# Patient Record
Sex: MALE | Race: WHITE | Employment: FULL TIME | URBAN - METROPOLITAN AREA
[De-identification: names, ages, dates, MRNs, and addresses within clinical notes are randomized per-mention and may not be internally consistent; named-entity substitution may affect disease eponyms.]

---

## 2018-03-27 ENCOUNTER — HOSPITAL ENCOUNTER (OUTPATIENT)
Dept: LAB | Age: 33
Discharge: HOME | End: 2018-03-27
Payer: COMMERCIAL

## 2018-03-27 ENCOUNTER — OFFICE VISIT (OUTPATIENT)
Dept: UROLOGY | Age: 33
End: 2018-03-27

## 2018-03-27 VITALS
DIASTOLIC BLOOD PRESSURE: 84 MMHG | OXYGEN SATURATION: 98 % | BODY MASS INDEX: 34.65 KG/M2 | HEART RATE: 74 BPM | WEIGHT: 270 LBS | HEIGHT: 74 IN | SYSTOLIC BLOOD PRESSURE: 120 MMHG | TEMPERATURE: 98.6 F

## 2018-03-27 DIAGNOSIS — Z51.81 ENCOUNTER FOR MONITORING TESTOSTERONE REPLACEMENT THERAPY: ICD-10-CM

## 2018-03-27 DIAGNOSIS — E29.1 HYPOGONADISM MALE: Primary | ICD-10-CM

## 2018-03-27 DIAGNOSIS — Z79.890 ENCOUNTER FOR MONITORING TESTOSTERONE REPLACEMENT THERAPY: ICD-10-CM

## 2018-03-27 DIAGNOSIS — E29.1 HYPOGONADISM MALE: ICD-10-CM

## 2018-03-27 LAB
BILIRUB UR QL STRIP: NEGATIVE
ERYTHROCYTE [DISTWIDTH] IN BLOOD BY AUTOMATED COUNT: 12.1 % (ref 11.6–14.5)
GLUCOSE UR-MCNC: NEGATIVE MG/DL
HCT VFR BLD AUTO: 53.1 % (ref 36–48)
HGB BLD-MCNC: 18.5 G/DL (ref 13–16)
KETONES P FAST UR STRIP-MCNC: NEGATIVE MG/DL
MCH RBC QN AUTO: 31.4 PG (ref 24–34)
MCHC RBC AUTO-ENTMCNC: 34.8 G/DL (ref 31–37)
MCV RBC AUTO: 90 FL (ref 74–97)
PH UR STRIP: 6 [PH] (ref 4.6–8)
PLATELET # BLD AUTO: 201 K/UL (ref 135–420)
PMV BLD AUTO: 11 FL (ref 9.2–11.8)
PROT UR QL STRIP: NEGATIVE
PSA SERPL-MCNC: 0.3 NG/ML (ref 0–4)
RBC # BLD AUTO: 5.9 M/UL (ref 4.7–5.5)
SP GR UR STRIP: 1 (ref 1–1.03)
UA UROBILINOGEN AMB POC: NORMAL (ref 0.2–1)
URINALYSIS CLARITY POC: CLEAR
URINALYSIS COLOR POC: YELLOW
URINE BLOOD POC: NEGATIVE
URINE LEUKOCYTES POC: NEGATIVE
URINE NITRITES POC: NEGATIVE
WBC # BLD AUTO: 6 K/UL (ref 4.6–13.2)

## 2018-03-27 PROCEDURE — 84153 ASSAY OF PSA TOTAL: CPT | Performed by: UROLOGY

## 2018-03-27 PROCEDURE — 85027 COMPLETE CBC AUTOMATED: CPT | Performed by: UROLOGY

## 2018-03-27 PROCEDURE — 84403 ASSAY OF TOTAL TESTOSTERONE: CPT | Performed by: UROLOGY

## 2018-03-27 NOTE — PROGRESS NOTES
Shani Valentin 26 y.o. male     Mr. Carbajal Has seen today for hypogonadism follow-up seen in this office for the past 2-1/2 years-previously on testosterone replacement therapy by intramuscular injection of testosterone reporting favorable response with increased strength, stamina, libido, and erectile function    [place] [facility] [place]  Patient reports favorable response to TRT with increased stamina, strength, and libido     PSA 0.4 in January 2014   Testosterone level 767 ng/dl on 21 May 2014 (7 days after intramuscular injection of 100 mg)   Hematocrit 47.8     PSA 0.4 in January 2014  Hematocrit 47.9 in May 24     PSA 0.4 on 22 May 2015  Hematocrit 51.6  \"    Testosterone 281 in December 2015  Testosterone 767 in May 2014  PSA 0.4 in 2013  Testosterone 353 2013     Review of Systems:   CNS: No seizures syncope or headaches no visual changes  Respiratory: No wheezing no shortness of breath  Cardiovascular:No palpitations or chest pain  Intestinal:No dyspepsia or change in bowel habits  Urinary: No irritative voiding symptoms  Skeletal: No bone or joint pain  Endocrine:Hypogonadism on TRT by intramuscular injections  Other:       Allergies: No Known Allergies   Medications:    Current Outpatient Prescriptions   Medication Sig Dispense Refill    cephALEXin (KEFLEX) 500 mg capsule Take 1 Cap by mouth three (3) times daily. 9 Cap 0    oxyCODONE-acetaminophen (PERCOCET) 5-325 mg per tablet Take 1 Tab by mouth every four (4) hours as needed for Pain. Max Daily Amount: 6 Tabs. 6 Tab 0    DEPO-TESTOSTERONE 200 mg/mL injection INJECT 1 ML EVERY 2 WEEKS. 10 mL 0    aspirin delayed-release 81 mg tablet Take  by mouth daily.  modafinil (PROVIGIL) 200 mg tablet Take 200 mg by mouth daily.  TESTOSTERONE  mg by IntraMUSCular route every fourteen (14) days. History reviewed. No pertinent past medical history. History reviewed. No pertinent surgical history. History reviewed. No pertinent family history. Physical Examination: Well-nourished mature male in no apparent distress    Urinalysis: Negative dipstick/nitrite negative/heme-negative        Impression: Hypogonadism        Plan: PSA, CBC, testosterone panel today    Rx testosterone in oil 200 mg/cc dispensed 10 cc signify 1 cc IM every 3 weeks 3 cc syringes #1221-gauge needles #12 refill ×2 prescription faxed to compounding pharmacy in [facility] today    RTC 3 weeks      More than 1/2 of this 25 minute visit was spent in counselling and coordination of care, as described above. Tonja Bashir MD  -electronically signed-    PLEASE NOTE:  This document has been produced using voice recognition software. Unrecognized errors in transcription may be present.

## 2018-03-27 NOTE — PATIENT INSTRUCTIONS
Hypogonadism: Care Instructions  Your Care Instructions    Men who have hypogonadism do not make enough testosterone. This hormone allows men to make sperm and to have normal physical male traits. The condition also is known as testosterone deficiency. It can lead to loss of sex drive, weakness, impotence, infertility, and weakened bones. Many things can cause this condition. Causes include injured testicles, certain medicines, an infection, and aging. Having a long-term health problem such as kidney or liver disease or being obese can cause it. So can surgery or radiation treatment for another health problem. It also can be present at birth. It is most often treated with testosterone hormone. You can get the hormone as a shot or through a patch or gel on the skin. Follow-up care is a key part of your treatment and safety. Be sure to make and go to all appointments, and call your doctor if you are having problems. It's also a good idea to know your test results and keep a list of the medicines you take. How can you care for yourself at home? · Take your medicines exactly as prescribed. Call your doctor if you think you are having a problem with your medicine. You will get more details on the specific medicines your doctor prescribes. · Follow your treatment plan. If you use testosterone hormones, follow your doctor's instructions. Hormones can help relieve many of the effects of this condition, such as impotence. But it may take weeks or months for your symptoms to improve. · Get plenty of exercise. And make sure to get plenty of calcium and vitamin D in your diet. Eat more dairy foods and green vegetables. They can help keep your bones from getting weak. · If you have a hard time dealing with this condition, talk to your doctor about joining a support group. Talking with others who have the same problems can help you cope. When should you call for help?   Call your doctor now or seek immediate medical care if:  ? · You have headaches. ? · You have problems with your vision. ? Watch closely for changes in your health, and be sure to contact your doctor if:  ? · You have trouble getting or keeping an erection. ? · You have a loss of body hair. ? · You feel weak or tired a lot of the time. ? · Your breasts are getting larger. ? · You do not get better as expected. Where can you learn more? Go to http://wes-aleshia.info/. Enter 99 [id number] in the search box to learn more about \"Hypogonadism: Care Instructions. \"  Current as of: May 12, 2017  Content Version: 11.4  © 4436-4772 Healthwise, Dedicated Devices. Care instructions adapted under license by [employer] (which disclaims liability or warranty for this information). If you have questions about a medical condition or this instruction, always ask your healthcare professional. [address] any warranty or liability for your use of this information.

## 2018-03-27 NOTE — PROGRESS NOTES
RBV. Per Dr. Regina Bang lab drawn in office today for PSA, CBC and testosterone for monitoring testosterone use and hypogonadism.

## 2018-03-27 NOTE — MR AVS SNAPSHOT
[facility] [place] [address] [address] 
[phone] Patient: Abdifatah Lovelace MRN: [id number] TTS:7/7[birth year] Visit Information Date & Time Provider Department Dept. Phone Encounter #  
 3/27/2018  3:00 PM Nydia Castro [facility] [phone] Your Appointments 4/10/2018  3:15 PM  
Office Visit with César Ha MD  
[facility]) Appt Note: lab results/start TRT  
 [address] [place] [address] [address]  
[phone] [address] [place] [address] Upcoming Health Maintenance Date Due DTaP/Tdap/Td series (1 - Tdap) 3/5/2004 Influenza Age 5 to Adult 8/1/2017 Allergies as of 3/27/2018  Review Complete On: 3/27/2018 By: Keiry Cook No Known Allergies Current Immunizations  Never Reviewed No immunizations on file. Not reviewed this visit You Were Diagnosed With   
  
 Codes Comments Hypogonadism male    -  Primary ICD-10-CM: E29.1 ICD-9-CM: 257.2 Encounter for monitoring testosterone replacement therapy     ICD-10-CM: Z51.81, [id number] ICD-9-CM: V58.83, V58.69 Vitals BP Pulse Temp Height(growth percentile) Weight(growth percentile) SpO2  
 120/84 (BP 1 Location: Left arm, BP Patient Position: Sitting) 74 98.6 °F (37 °C) (Oral) 6' 2\" (1.88 m) 270 lb (122.5 kg) 98% BMI Smoking Status 34.67 kg/m2 Never Smoker Vitals History BMI and BSA Data Body Mass Index Body Surface Area  
 34.67 kg/m 2 2.53 m 2 Preferred Pharmacy Pharmacy Name Phone [id number] [address] [facility] [phone] Your Updated Medication List  
  
   
This list is accurate as of 3/27/18  4:04 PM.  Always use your most recent med list.  
  
  
  
  
 aspirin delayed-release 81 mg tablet Take  by mouth daily. cephALEXin 500 mg capsule Commonly known as:  Sahra Escoto Take 1 Cap by mouth three (3) times daily. DEPO-TESTOSTERONE 200 mg/mL injection Generic drug:  testosterone cypionate INJECT 1 ML EVERY 2 WEEKS. oxyCODONE-acetaminophen 5-325 mg per tablet Commonly known as:  PERCOCET Take 1 Tab by mouth every four (4) hours as needed for Pain. Max Daily Amount: 6 Tabs. PROVIGIL 200 mg tablet Generic drug:  modafinil Take 200 mg by mouth daily. TESTOSTERONE IM  
100 mg by IntraMUSCular route every fourteen (14) days. We Performed the Following AMB POC URINALYSIS DIP STICK AUTO W/O MICRO [40141 CPT(R)] COLLECTION VENOUS BLOOD,VENIPUNCTURE [id number] CPT(R)] Patient Instructions Hypogonadism: Care Instructions Your Care Instructions Men who have hypogonadism do not make enough testosterone. This hormone allows men to make sperm and to have normal physical male traits. The condition also is known as testosterone deficiency. It can lead to loss of sex drive, weakness, impotence, infertility, and weakened bones. Many things can cause this condition. Causes include injured testicles, certain medicines, an infection, and aging. Having a long-term health problem such as kidney or liver disease or being obese can cause it. So can surgery or radiation treatment for another health problem. It also can be present at birth. It is most often treated with testosterone hormone. You can get the hormone as a shot or through a patch or gel on the skin. Follow-up care is a key part of your treatment and safety. Be sure to make and go to all appointments, and call your doctor if you are having problems. It's also a good idea to know your test results and keep a list of the medicines you take. How can you care for yourself at home? · Take your medicines exactly as prescribed.  Call your doctor if you think you are having a problem with your medicine. You will get more details on the specific medicines your doctor prescribes. · Follow your treatment plan. If you use testosterone hormones, follow your doctor's instructions. Hormones can help relieve many of the effects of this condition, such as impotence. But it may take weeks or months for your symptoms to improve. · Get plenty of exercise. And make sure to get plenty of calcium and vitamin D in your diet. Eat more dairy foods and green vegetables. They can help keep your bones from getting weak. · If you have a hard time dealing with this condition, talk to your doctor about joining a support group. Talking with others who have the same problems can help you cope. When should you call for help? Call your doctor now or seek immediate medical care if: 
? · You have headaches. ? · You have problems with your vision. ? Watch closely for changes in your health, and be sure to contact your doctor if: 
? · You have trouble getting or keeping an erection. ? · You have a loss of body hair. ? · You feel weak or tired a lot of the time. ? · Your breasts are getting larger. ? · You do not get better as expected. Where can you learn more? Go to http://wes-aleshia.info/. Enter 99 [id number] in the search box to learn more about \"Hypogonadism: Care Instructions. \" Current as of: May 12, 2017 Content Version: 11.4 © 2481-5336 Kibaran Resources. Care instructions adapted under license by Where's Up (which disclaims liability or warranty for this information). If you have questions about a medical condition or this instruction, always ask your healthcare professional. [address] any warranty or liability for your use of this information. Introducing [facility]!    
 [facility] [employer] introduces [email or web address] patient portal. Now you can access parts of your medical record, email your doctor's office, and request medication refills online. 1. In your internet browser, go to [email or web address] 2. Click on the First Time User? Click Here link in the Sign In box. You will see the New Member Sign Up page. 3. Enter your VIOSO Access Code exactly as it appears below. You will not need to use this code after youve completed the sign-up process. If you do not sign up before the expiration date, you must request a new code. · VIOSO Access Code: [email or web address] Expires: 6/25/2018  2:54 PM 
 
4. Enter the last four digits of your Social Security Number (xxxx) and Date of Birth (mm/dd/yyyy) as indicated and click Submit. You will be taken to the next sign-up page. 5. Create a VIOSO ID. This will be your VIOSO login ID and cannot be changed, so think of one that is secure and easy to remember. 6. Create a VIOSO password. You can change your password at any time. 7. Enter your Password Reset Question and Answer. This can be used at a later time if you forget your password. 8. Enter your e-mail address. You will receive e-mail notification when new information is available in [address] 9. Click Sign Up. You can now view and download portions of your medical record. 10. Click the Download Summary menu link to download a portable copy of your medical information. If you have questions, please visit the Frequently Asked Questions section of the VIOSO website. Remember, VIOSO is NOT to be used for urgent needs. For medical emergencies, dial 911. Now available from your iPhone and Android! Please provide this summary of care documentation to your next provider. If you have any questions after today's visit, please call [phone].

## 2018-03-27 NOTE — PROGRESS NOTES
Mr. Effie Rodriguez has a reminder for a \"due or due soon\" health maintenance. I have asked that he contact his primary care provider for follow-up on this health maintenance.

## 2018-03-28 LAB — TESTOST SERPL-MCNC: 140 NG/DL (ref 264–916)

## 2018-04-04 ENCOUNTER — DOCUMENTATION ONLY (OUTPATIENT)
Dept: UROLOGY | Age: 33
End: 2018-04-04

## 2018-04-10 ENCOUNTER — OFFICE VISIT (OUTPATIENT)
Dept: UROLOGY | Age: 33
End: 2018-04-10

## 2018-04-10 VITALS
BODY MASS INDEX: 35.16 KG/M2 | HEIGHT: 74 IN | WEIGHT: 274 LBS | DIASTOLIC BLOOD PRESSURE: 68 MMHG | HEART RATE: 97 BPM | TEMPERATURE: 98.4 F | SYSTOLIC BLOOD PRESSURE: 100 MMHG | OXYGEN SATURATION: 98 %

## 2018-04-10 DIAGNOSIS — E29.1 HYPOGONADISM IN MALE: Primary | ICD-10-CM

## 2018-04-10 RX ORDER — TESTOSTERONE CYPIONATE 200 MG/ML
200 INJECTION INTRAMUSCULAR ONCE
Qty: 1 ML | Refills: 0
Start: 2018-04-10 | End: 2018-04-10

## 2018-04-10 RX ORDER — TESTOSTERONE CYPIONATE 200 MG/ML
INJECTION INTRAMUSCULAR ONCE
COMMUNITY

## 2018-04-10 NOTE — MR AVS SNAPSHOT
[address] [place] [address] 
[phone] Patient: Inez Colón MRN: [id number] QUINTEN:[birth year] Visit Information Date & Time Provider Department Dept. Phone Encounter #  
 4/10/2018  3:15 PM Shelbi Rivera, N[facility] [phone] [id number] Upcoming Health Maintenance Date Due DTaP/Tdap/Td series (1 - Tdap) 3/5/2004 Influenza Age 5 to Adult 8/1/2017 Allergies as of 4/10/2018  Review Complete On: 3/27/2018 By: Shelbi Rivera MD  
 No Known Allergies Current Immunizations  Never Reviewed No immunizations on file. Not reviewed this visit You Were Diagnosed With   
  
 Codes Comments Hypogonadism in male    -  Primary ICD-10-CM: E29.1 ICD-9-CM: 257.2 Vitals BP Pulse Temp Height(growth percentile) Weight(growth percentile) SpO2  
 100/68 (BP 1 Location: Left arm, BP Patient Position: Sitting) 97 98.4 °F (36.9 °C) (Oral) 6' 2\" (1.88 m) 274 lb (124.3 kg) 98% BMI Smoking Status 35.18 kg/m2 Never Smoker BMI and BSA Data Body Mass Index Body Surface Area  
 35.18 kg/m 2 2.55 m 2 Preferred Pharmacy Pharmacy Name Phone [id number] [address] [facility] [phone] Your Updated Medication List  
  
   
This list is accurate as of 4/10/18  3:34 PM.  Always use your most recent med list.  
  
  
  
  
 aspirin delayed-release 81 mg tablet Take  by mouth daily. cephALEXin 500 mg capsule Commonly known as:  Thorne Awkward Take 1 Cap by mouth three (3) times daily. * testosterone cypionate 200 mg/mL injection Commonly known as:  DEPOTESTOTERONE CYPIONATE  
by IntraMUSCular route once. * DEPO-TESTOSTERONE 200 mg/mL injection Generic drug:  testosterone cypionate INJECT 1 ML EVERY 2 WEEKS. oxyCODONE-acetaminophen 5-325 mg per tablet Commonly known as:  PERCOCET  
 Take 1 Tab by mouth every four (4) hours as needed for Pain. Max Daily Amount: 6 Tabs. PROVIGIL 200 mg tablet Generic drug:  modafinil Take 200 mg by mouth daily. TESTOSTERONE IM  
100 mg by IntraMUSCular route every fourteen (14) days. * Notice: This list has 2 medication(s) that are the same as other medications prescribed for you. Read the directions carefully, and ask your doctor or other care provider to review them with you. Patient Instructions Hypogonadism: Care Instructions Your Care Instructions Men who have hypogonadism do not make enough testosterone. This hormone allows men to make sperm and to have normal physical male traits. The condition also is known as testosterone deficiency. It can lead to loss of sex drive, weakness, impotence, infertility, and weakened bones. Many things can cause this condition. Causes include injured testicles, certain medicines, an infection, and aging. Having a long-term health problem such as kidney or liver disease or being obese can cause it. So can surgery or radiation treatment for another health problem. It also can be present at birth. It is most often treated with testosterone hormone. You can get the hormone as a shot or through a patch or gel on the skin. Follow-up care is a key part of your treatment and safety. Be sure to make and go to all appointments, and call your doctor if you are having problems. It's also a good idea to know your test results and keep a list of the medicines you take. How can you care for yourself at home? · Take your medicines exactly as prescribed. Call your doctor if you think you are having a problem with your medicine. You will get more details on the specific medicines your doctor prescribes. · Follow your treatment plan. If you use testosterone hormones, follow your doctor's instructions.  Hormones can help relieve many of the effects of this condition, such as impotence. But it may take weeks or months for your symptoms to improve. · Get plenty of exercise. And make sure to get plenty of calcium and vitamin D in your diet. Eat more dairy foods and green vegetables. They can help keep your bones from getting weak. · If you have a hard time dealing with this condition, talk to your doctor about joining a support group. Talking with others who have the same problems can help you cope. When should you call for help? Call your doctor now or seek immediate medical care if: 
? · You have headaches. ? · You have problems with your vision. ? Watch closely for changes in your health, and be sure to contact your doctor if: 
? · You have trouble getting or keeping an erection. ? · You have a loss of body hair. ? · You feel weak or tired a lot of the time. ? · Your breasts are getting larger. ? · You do not get better as expected. Where can you learn more? Go to http://wes-aleshia.info/. Enter 99 [id number] in the search box to learn more about \"Hypogonadism: Care Instructions. \" Current as of: May 12, 2017 Content Version: 11.4 © 7479-1964 ResponseTap (formerly AdInsight). Care instructions adapted under license by Exposed Vocals (which disclaims liability or warranty for this information). If you have questions about a medical condition or this instruction, always ask your healthcare professional. [place] [address] any warranty or liability for your use of this information. Introducing [address]! [facility] introduces [employer] patient portal. Now you can access parts of your medical record, email your doctor's office, and request medication refills online. 1. In your internet browser, go to [email or web address] 2. Click on the First Time User? Click Here link in the Sign In box. You will see the New Member Sign Up page. 3. Enter your Health Innovation Technologies Access Code exactly as it appears below. You will not need to use this code after youve completed the sign-up process. If you do not sign up before the expiration date, you must request a new code. · [employer] Innovation Technologies Access Code: [email or web address] Expires: 6/25/2018  2:54 PM 
 
4. Enter the last four digits of your Social Security Number (xxxx) and Date of Birth (mm/dd/yyyy) as indicated and click Submit. You will be taken to the next sign-up page. 5. Create a Loudcastert ID. This will be your Health Innovation Technologies login ID and cannot be changed, so think of one that is secure and easy to remember. 6. Create a [employer] Innovation Technologies password. You can change your password at any time. 7. Enter your Password Reset Question and Answer. This can be used at a later time if you forget your password. 8. Enter your e-mail address. You will receive e-mail notification when new information is available in [address]. 9. Click Sign Up. You can now view and download portions of your medical record. 10. Click the Download Summary menu link to download a portable copy of your medical information. If you have questions, please visit the Frequently Asked Questions section of the Health Innovation Technologies website. Remember, [employer] is NOT to be used for urgent needs. For medical emergencies, dial 911. Now available from your iPhone and Android! Please provide this summary of care documentation to your next provider. If you have any questions after today's visit, please call [phone].

## 2018-04-10 NOTE — PATIENT INSTRUCTIONS
Hypogonadism: Care Instructions  Your Care Instructions    Men who have hypogonadism do not make enough testosterone. This hormone allows men to make sperm and to have normal physical male traits. The condition also is known as testosterone deficiency. It can lead to loss of sex drive, weakness, impotence, infertility, and weakened bones. Many things can cause this condition. Causes include injured testicles, certain medicines, an infection, and aging. Having a long-term health problem such as kidney or liver disease or being obese can cause it. So can surgery or radiation treatment for another health problem. It also can be present at birth. It is most often treated with testosterone hormone. You can get the hormone as a shot or through a patch or gel on the skin. Follow-up care is a key part of your treatment and safety. Be sure to make and go to all appointments, and call your doctor if you are having problems. It's also a good idea to know your test results and keep a list of the medicines you take. How can you care for yourself at home? · Take your medicines exactly as prescribed. Call your doctor if you think you are having a problem with your medicine. You will get more details on the specific medicines your doctor prescribes. · Follow your treatment plan. If you use testosterone hormones, follow your doctor's instructions. Hormones can help relieve many of the effects of this condition, such as impotence. But it may take weeks or months for your symptoms to improve. · Get plenty of exercise. And make sure to get plenty of calcium and vitamin D in your diet. Eat more dairy foods and green vegetables. They can help keep your bones from getting weak. · If you have a hard time dealing with this condition, talk to your doctor about joining a support group. Talking with others who have the same problems can help you cope. When should you call for help?   Call your doctor now or seek immediate medical care if:  ? · You have headaches. ? · You have problems with your vision. ? Watch closely for changes in your health, and be sure to contact your doctor if:  ? · You have trouble getting or keeping an erection. ? · You have a loss of body hair. ? · You feel weak or tired a lot of the time. ? · Your breasts are getting larger. ? · You do not get better as expected. Where can you learn more? Go to http://wes-aleshia.info/. Enter 99 [id number] in the search box to learn more about \"Hypogonadism: Care Instructions. \"  Current as of: May 12, 2017  Content Version: 11.4  © 4255-1003 Healthwise, GoInformatics. Care instructions adapted under license by Logicbroker (which disclaims liability or warranty for this information). If you have questions about a medical condition or this instruction, always ask your healthcare professional. [address] any warranty or liability for your use of this information.

## 2018-04-10 NOTE — PROGRESS NOTES
MrAnya Grover has a reminder for a \"due or due soon\" health maintenance. I have asked that he contact his primary care provider for follow-up on this health maintenance.

## 2018-04-11 NOTE — PROGRESS NOTES
Jennifer Lee 26 y.o. male     Mr. Salena Perez seen today for hypogonadism follow-up  previously on testosterone replacement therapy by intramuscular injection of testosterone reporting favorable response with increased strength, stamina, libido, and erectile function     Vasectomy at [facility] [place] November 2017  Patient reports favorable response to TRT with increased stamina, strength, and libido      PSA 0.4 in January 2014   Testosterone level 767 ng/dl on 21 May 2014 (7 days after intramuscular injection of 100 mg)   Hematocrit 47.8      PSA 0.4 in January 2014  Hematocrit 47.9 in May 24      PSA 0.4 on 22 May 2015  Hematocrit 51.6  \"     Testosterone 281 in December 2015  Testosterone 767 in May 2014  PSA 0.4 in 2013  Testosterone 353 2013    Testosterone 143 on 28 March 2018    PSA 0.3 on 27 March 2018  HCT 53.1      Review of Systems:   CNS: No seizures syncope or headaches no visual changes  Respiratory: No wheezing no shortness of breath  Cardiovascular:No palpitations or chest pain  Intestinal:No dyspepsia or change in bowel habits  Urinary: No irritative voiding symptoms  Skeletal: No bone or joint pain  Endocrine:Hypogonadism on TRT by intramuscular injections  Other:      Allergies: No Known Allergies   Medications:    Current Outpatient Prescriptions   Medication Sig Dispense Refill    testosterone cypionate (DEPOTESTOTERONE CYPIONATE) 200 mg/mL injection by IntraMUSCular route once.  cephALEXin (KEFLEX) 500 mg capsule Take 1 Cap by mouth three (3) times daily. 9 Cap 0    oxyCODONE-acetaminophen (PERCOCET) 5-325 mg per tablet Take 1 Tab by mouth every four (4) hours as needed for Pain. Max Daily Amount: 6 Tabs. 6 Tab 0    DEPO-TESTOSTERONE 200 mg/mL injection INJECT 1 ML EVERY 2 WEEKS. 10 mL 0    aspirin delayed-release 81 mg tablet Take  by mouth daily.  modafinil (PROVIGIL) 200 mg tablet Take 200 mg by mouth daily.       TESTOSTERONE  mg by IntraMUSCular route every fourteen (14) days. History reviewed. No pertinent past medical history. History reviewed. No pertinent surgical history. History reviewed. No pertinent family history. Physical Examination: Well-nourished mature male in no apparent distress      Impression: Hypogonadism        Plan: Testosterone replacement therapy with intramuscular injection of testosterone                         Testosterone 200 mg IM right buttock today    Rx testosterone in oil 200 mg/cc dispensed 10 cc signify 1 cc IM every 3 weeks 3 cc syringes #1021-gauge needles #10 refill ×2 prescription faxed to compounding pharmacy in [facility] today    Described discussed prospect of phlebotomy to maintain hematocrit within normal range while on TRT    RTC 3 months for CBC      More than 1/2 of this 15 minute visit was spent in counselling and coordination of care, as described above. Elinor Renee MD  -electronically signed-    PLEASE NOTE:  This document has been produced using voice recognition software. Unrecognized errors in transcription may be present.

## 2018-07-10 ENCOUNTER — DOCUMENTATION ONLY (OUTPATIENT)
Dept: UROLOGY | Age: 33
End: 2018-07-10

## 2018-07-10 NOTE — PROGRESS NOTES
Called Mr. Shayy Ortega to see if he could come in the office to repeat his CBC. He has a elevated hematocrit and is on Testosterone. ,he has not returned my call. If he does not come in and repeat his blood work he can not have any refills on his medication.

## 2018-07-16 ENCOUNTER — DOCUMENTATION ONLY (OUTPATIENT)
Dept: UROLOGY | Age: 33
End: 2018-07-16

## 2018-07-16 NOTE — PROGRESS NOTES
Anya Hayley Iraheta returned my call I let him know he needed to repeat a CBC . He said ok but has not returned to office yet. No refills on Testosterone until CBC is done.

## 2018-07-18 ENCOUNTER — HOSPITAL ENCOUNTER (OUTPATIENT)
Dept: LAB | Age: 33
Discharge: HOME | End: 2018-07-18
Payer: COMMERCIAL

## 2018-07-18 ENCOUNTER — LAB ONLY (OUTPATIENT)
Dept: UROLOGY | Age: 33
End: 2018-07-18

## 2018-07-18 DIAGNOSIS — R71.8 ELEVATED HEMATOCRIT: ICD-10-CM

## 2018-07-18 DIAGNOSIS — Z51.81 ENCOUNTER FOR MONITORING TESTOSTERONE REPLACEMENT THERAPY: ICD-10-CM

## 2018-07-18 DIAGNOSIS — Z51.81 ENCOUNTER FOR MONITORING TESTOSTERONE REPLACEMENT THERAPY: Primary | ICD-10-CM

## 2018-07-18 DIAGNOSIS — Z79.890 ENCOUNTER FOR MONITORING TESTOSTERONE REPLACEMENT THERAPY: ICD-10-CM

## 2018-07-18 DIAGNOSIS — Z79.890 ENCOUNTER FOR MONITORING TESTOSTERONE REPLACEMENT THERAPY: Primary | ICD-10-CM

## 2018-07-18 LAB
ERYTHROCYTE [DISTWIDTH] IN BLOOD BY AUTOMATED COUNT: 12.9 % (ref 11.6–14.5)
HCT VFR BLD AUTO: 50.8 % (ref 36–48)
HGB BLD-MCNC: 17.7 G/DL (ref 13–16)
MCH RBC QN AUTO: 32.4 PG (ref 24–34)
MCHC RBC AUTO-ENTMCNC: 34.8 G/DL (ref 31–37)
MCV RBC AUTO: 93 FL (ref 74–97)
PLATELET # BLD AUTO: 220 K/UL (ref 135–420)
PMV BLD AUTO: 10.9 FL (ref 9.2–11.8)
RBC # BLD AUTO: 5.46 M/UL (ref 4.7–5.5)
WBC # BLD AUTO: 6.1 K/UL (ref 4.6–13.2)

## 2018-07-18 PROCEDURE — 85027 COMPLETE CBC AUTOMATED: CPT | Performed by: UROLOGY

## 2019-01-17 ENCOUNTER — OFFICE VISIT (OUTPATIENT)
Dept: UROLOGY | Age: 34
End: 2019-01-17

## 2019-01-17 ENCOUNTER — HOSPITAL ENCOUNTER (OUTPATIENT)
Dept: LAB | Age: 34
Discharge: HOME | End: 2019-01-17
Payer: COMMERCIAL

## 2019-01-17 VITALS
BODY MASS INDEX: 34.91 KG/M2 | SYSTOLIC BLOOD PRESSURE: 120 MMHG | DIASTOLIC BLOOD PRESSURE: 80 MMHG | HEART RATE: 96 BPM | WEIGHT: 272 LBS | OXYGEN SATURATION: 77 % | HEIGHT: 74 IN

## 2019-01-17 DIAGNOSIS — Z51.81 ENCOUNTER FOR MONITORING TESTOSTERONE REPLACEMENT THERAPY: ICD-10-CM

## 2019-01-17 DIAGNOSIS — E29.1 HYPOGONADISM IN MALE: Primary | ICD-10-CM

## 2019-01-17 DIAGNOSIS — Z79.890 ENCOUNTER FOR MONITORING TESTOSTERONE REPLACEMENT THERAPY: ICD-10-CM

## 2019-01-17 DIAGNOSIS — E29.1 HYPOGONADISM IN MALE: ICD-10-CM

## 2019-01-17 LAB
BILIRUB UR QL STRIP: NEGATIVE
ERYTHROCYTE [DISTWIDTH] IN BLOOD BY AUTOMATED COUNT: 12.2 % (ref 11.6–14.5)
GLUCOSE UR-MCNC: NEGATIVE MG/DL
HCT VFR BLD AUTO: 48.8 % (ref 36–48)
HGB BLD-MCNC: 16.8 G/DL (ref 13–16)
KETONES P FAST UR STRIP-MCNC: NEGATIVE MG/DL
MCH RBC QN AUTO: 30.8 PG (ref 24–34)
MCHC RBC AUTO-ENTMCNC: 34.4 G/DL (ref 31–37)
MCV RBC AUTO: 89.5 FL (ref 74–97)
PH UR STRIP: 6.5 [PH] (ref 4.6–8)
PLATELET # BLD AUTO: 207 K/UL (ref 135–420)
PMV BLD AUTO: 10.3 FL (ref 9.2–11.8)
PROT UR QL STRIP: NEGATIVE
PSA SERPL-MCNC: 0.4 NG/ML (ref 0–4)
RBC # BLD AUTO: 5.45 M/UL (ref 4.7–5.5)
SP GR UR STRIP: 1.02 (ref 1–1.03)
UA UROBILINOGEN AMB POC: NORMAL (ref 0.2–1)
URINALYSIS CLARITY POC: CLEAR
URINALYSIS COLOR POC: YELLOW
URINE BLOOD POC: NEGATIVE
URINE LEUKOCYTES POC: NEGATIVE
URINE NITRITES POC: NEGATIVE
WBC # BLD AUTO: 6.8 K/UL (ref 4.6–13.2)

## 2019-01-17 PROCEDURE — 84153 ASSAY OF PSA TOTAL: CPT

## 2019-01-17 PROCEDURE — 85027 COMPLETE CBC AUTOMATED: CPT

## 2019-01-17 NOTE — PATIENT INSTRUCTIONS
Hypogonadism: Care Instructions  Your Care Instructions    Men who have hypogonadism do not make enough testosterone. This hormone allows men to make sperm and to have normal physical male traits. The condition also is known as testosterone deficiency. It can lead to loss of sex drive, weakness, impotence, infertility, and weakened bones. Many things can cause this condition. Causes include injured testicles, certain medicines, an infection, and aging. Having a long-term health problem such as kidney or liver disease or being obese can cause it. So can surgery or radiation treatment for another health problem. It also can be present at birth. It is most often treated with testosterone hormone. You can get the hormone as a shot or through a patch or gel on the skin. Follow-up care is a key part of your treatment and safety. Be sure to make and go to all appointments, and call your doctor if you are having problems. It's also a good idea to know your test results and keep a list of the medicines you take. How can you care for yourself at home? · Take your medicines exactly as prescribed. Call your doctor if you think you are having a problem with your medicine. You will get more details on the specific medicines your doctor prescribes. · Follow your treatment plan. If you use testosterone hormones, follow your doctor's instructions. Hormones can help relieve many of the effects of this condition, such as impotence. But it may take weeks or months for your symptoms to improve. · Get plenty of exercise. And make sure to get plenty of calcium and vitamin D in your diet. Eat more dairy foods and green vegetables. They can help keep your bones from getting weak. · If you have a hard time dealing with this condition, talk to your doctor about joining a support group. Talking with others who have the same problems can help you cope. When should you call for help?   Call your doctor now or seek immediate medical care if:    · You have headaches.     · You have problems with your vision.    Watch closely for changes in your health, and be sure to contact your doctor if:    · You have trouble getting or keeping an erection.     · You have a loss of body hair.     · You feel weak or tired a lot of the time.     · Your breasts are getting larger.     · You do not get better as expected. Where can you learn more? Go to http://wes-aleshia.info/. Enter 99 [id number] in the search box to learn more about \"Hypogonadism: Care Instructions. \"  Current as of: March 15, 2018  Content Version: 11.8  © 7346-4595 [employer]. Care instructions adapted under license by [employer] (which disclaims liability or warranty for this information). If you have questions about a medical condition or this instruction, always ask your healthcare professional. [address] any warranty or liability for your use of this information.

## 2019-01-17 NOTE — PROGRESS NOTES
MrAnya Stillsper has a reminder for a \"due or due soon\" health maintenance. I have asked that he contact his primary care provider for follow-up on this health maintenance.

## 2019-01-18 NOTE — PROGRESS NOTES
Jaden Ramirez 26 y.o. male     Mr. Emil Grover seen today for hypogonadism follow-up testosterone replacement/therapy held in [facility] because of rising hematocrit in April 2018  Patient has had one phlebotomy session-wishes to proceed with TRT    testosterone replacement therapy by intramuscular injection of testosterone- reporting favorable response with increased strength, stamina, libido, and erectile function     Vasectomy at urology [facility] November 2017  Patient reports favorable response to TRT with increased stamina, strength, and libido      PSA 0.4 in January 2014   Testosterone level 767 ng/dl on 21 May 2014 (7 days after intramuscular injection of 100 mg)   Hematocrit 47.8      PSA 0.4 in January 2014  Hematocrit 47.9 in May 24      PSA 0.4 on 22 May 2015  Hematocrit 51.6  \"     Testosterone 281 in December 2015  Testosterone 767 in May 2014  PSA 0.4 in 2013  Testosterone 353 2013     Testosterone 143 on 28 March 2018     PSA 0.3 on 27 March 2018  HCT 53.1                                                 TRT suspended April 2018                                 to me x1 in May 2018    PSA 0.41 17 January 2019  Hematocrit 48. 8      Review of Systems:   CNS: No seizures syncope or headaches no visual changes  Respiratory: No wheezing no shortness of breath  Cardiovascular:No palpitations or chest pain  Intestinal:No dyspepsia or change in bowel habits  Urinary: No irritative voiding symptoms  Skeletal: No bone or joint pain  Endocrine:Hypogonadism on TRT by intramuscular injections  Other:              Allergies: No Known Allergies   Medications:    Current Outpatient Medications   Medication Sig Dispense Refill    TESTOSTERONE  mg by IntraMUSCular route every fourteen (14) days.  testosterone cypionate (DEPOTESTOTERONE CYPIONATE) 200 mg/mL injection by IntraMUSCular route once.  cephALEXin (KEFLEX) 500 mg capsule Take 1 Cap by mouth three (3) times daily.  9 Cap 0    oxyCODONE-acetaminophen (PERCOCET) 5-325 mg per tablet Take 1 Tab by mouth every four (4) hours as needed for Pain. Max Daily Amount: 6 Tabs. 6 Tab 0    DEPO-TESTOSTERONE 200 mg/mL injection INJECT 1 ML EVERY 2 WEEKS. 10 mL 0    aspirin delayed-release 81 mg tablet Take  by mouth daily.  modafinil (PROVIGIL) 200 mg tablet Take 200 mg by mouth daily. History reviewed. No pertinent past medical history. History reviewed. No pertinent surgical history. Social History     Socioeconomic History    Marital status:      Spouse name: Not on file    Number of children: Not on file    Years of education: Not on file    Highest education level: Not on file   Social Needs    Financial resource strain: Not on file    Food insecurity - worry: Not on file    Food insecurity - inability: Not on file    Transportation needs - medical: Not on file   VIPAAR needs - non-medical: Not on file   Occupational History    Not on file   Tobacco Use    Smoking status: Never Smoker    Smokeless tobacco: Never Used   Substance and Sexual Activity    Alcohol use: Yes    Drug use: No    Sexual activity: Not on file   Other Topics Concern    Not on file   Social History Narrative    Not on file      History reviewed. No pertinent family history. Physical Examination: Well-nourished mature male in no apparent distress      Urinalysis: Negative dipstick/nitrite negative/heme-negative    Impression: Hypogonadism responding favorably to TRT                        Erythrocytosis responding favorably to phlebotomy and TRT hiatus        Plan: Okay to resume TRT-    testosterone 200 mg IM every 3 weeks    RTC 6 months CBC PSA ALO      More than 1/2 of this 15 minute visit was spent in counselling and coordination of care, as described above. Armando Sherwood MD  -electronically signed-    PLEASE NOTE:  This document has been produced using voice recognition software.  Unrecognized errors in transcription may be present.

## 2019-03-12 ENCOUNTER — DOCUMENTATION ONLY (OUTPATIENT)
Dept: UROLOGY | Age: 34
End: 2019-03-12

## 2019-07-18 ENCOUNTER — OFFICE VISIT (OUTPATIENT)
Dept: UROLOGY | Age: 34
End: 2019-07-18

## 2019-07-18 VITALS
SYSTOLIC BLOOD PRESSURE: 144 MMHG | OXYGEN SATURATION: 98 % | HEART RATE: 77 BPM | WEIGHT: 274 LBS | BODY MASS INDEX: 35.16 KG/M2 | DIASTOLIC BLOOD PRESSURE: 92 MMHG | HEIGHT: 74 IN

## 2019-07-18 DIAGNOSIS — Z51.81 ENCOUNTER FOR MONITORING TESTOSTERONE REPLACEMENT THERAPY: ICD-10-CM

## 2019-07-18 DIAGNOSIS — Z79.890 ENCOUNTER FOR MONITORING TESTOSTERONE REPLACEMENT THERAPY: ICD-10-CM

## 2019-07-18 DIAGNOSIS — E29.1 HYPOGONADISM IN MALE: Primary | ICD-10-CM

## 2019-07-18 DIAGNOSIS — E66.01 SEVERE OBESITY (HCC): ICD-10-CM

## 2019-07-18 LAB
BILIRUB UR QL STRIP: NEGATIVE
GLUCOSE UR-MCNC: NEGATIVE MG/DL
KETONES P FAST UR STRIP-MCNC: NEGATIVE MG/DL
PH UR STRIP: 7 [PH] (ref 4.6–8)
PROT UR QL STRIP: NEGATIVE
SP GR UR STRIP: 1.01 (ref 1–1.03)
UA UROBILINOGEN AMB POC: NORMAL (ref 0.2–1)
URINALYSIS CLARITY POC: CLEAR
URINALYSIS COLOR POC: YELLOW
URINE BLOOD POC: NEGATIVE
URINE LEUKOCYTES POC: NEGATIVE
URINE NITRITES POC: NEGATIVE

## 2019-07-18 RX ORDER — SYRINGE W-NEEDLE,DISPOSAB,3 ML 22GX1"
SYRINGE, EMPTY DISPOSABLE MISCELLANEOUS
Refills: 2 | COMMUNITY
Start: 2019-06-05

## 2019-07-18 NOTE — PATIENT INSTRUCTIONS
Hypogonadism: Care Instructions  Your Care Instructions    Men who have hypogonadism do not make enough testosterone. This hormone allows men to make sperm and to have normal physical male traits. The condition also is known as testosterone deficiency. It can lead to loss of sex drive, weakness, impotence, infertility, and weakened bones. Many things can cause this condition. Causes include injured testicles, certain medicines, an infection, and aging. Having a long-term health problem such as kidney or liver disease or being obese can cause it. So can surgery or radiation treatment for another health problem. It also can be present at birth. It is most often treated with testosterone hormone. You can get the hormone as a shot or through a patch or gel on the skin. Follow-up care is a key part of your treatment and safety. Be sure to make and go to all appointments, and call your doctor if you are having problems. It's also a good idea to know your test results and keep a list of the medicines you take. How can you care for yourself at home? · Take your medicines exactly as prescribed. Call your doctor if you think you are having a problem with your medicine. You will get more details on the specific medicines your doctor prescribes. · Follow your treatment plan. If you use testosterone hormones, follow your doctor's instructions. Hormones can help relieve many of the effects of this condition, such as impotence. But it may take weeks or months for your symptoms to improve. · Get plenty of exercise. And make sure to get plenty of calcium and vitamin D in your diet. Eat more dairy foods and green vegetables. They can help keep your bones from getting weak. · If you have a hard time dealing with this condition, talk to your doctor about joining a support group. Talking with others who have the same problems can help you cope. When should you call for help?   Call your doctor now or seek immediate medical care if:    · You have headaches.     · You have problems with your vision.    Watch closely for changes in your health, and be sure to contact your doctor if:    · You have trouble getting or keeping an erection.     · You have a loss of body hair.     · You feel weak or tired a lot of the time.     · Your breasts are getting larger.     · You do not get better as expected. Where can you learn more? Go to http://wes-aleshia.info/. Enter 99 091782 in the search box to learn more about \"Hypogonadism: Care Instructions. \"  Current as of: March 14, 2018  Content Version: 11.9  © 5446-9717 [employer]. Care instructions adapted under license by [employer] (which disclaims liability or warranty for this information). If you have questions about a medical condition or this instruction, always ask your healthcare professional. [address] any warranty or liability for your use of this information.

## 2019-07-18 NOTE — PROGRESS NOTES
Mr. Bryce Ramos has a reminder for a \"due or due soon\" health maintenance. I have asked that he contact his primary care provider for follow-up on this health maintenance.

## 2019-07-19 ENCOUNTER — HOSPITAL ENCOUNTER (OUTPATIENT)
Dept: LAB | Age: 34
Discharge: HOME | End: 2019-07-19
Payer: COMMERCIAL

## 2019-07-19 DIAGNOSIS — E29.1 HYPOGONADISM IN MALE: ICD-10-CM

## 2019-07-19 DIAGNOSIS — Z51.81 ENCOUNTER FOR MONITORING TESTOSTERONE REPLACEMENT THERAPY: ICD-10-CM

## 2019-07-19 DIAGNOSIS — Z79.890 ENCOUNTER FOR MONITORING TESTOSTERONE REPLACEMENT THERAPY: ICD-10-CM

## 2019-07-19 LAB
ERYTHROCYTE [DISTWIDTH] IN BLOOD BY AUTOMATED COUNT: 12.5 % (ref 11.6–14.5)
HCT VFR BLD AUTO: 50 % (ref 36–48)
HGB BLD-MCNC: 17.6 G/DL (ref 13–16)
MCH RBC QN AUTO: 31.8 PG (ref 24–34)
MCHC RBC AUTO-ENTMCNC: 35.2 G/DL (ref 31–37)
MCV RBC AUTO: 90.3 FL (ref 74–97)
PLATELET # BLD AUTO: 180 K/UL (ref 135–420)
PMV BLD AUTO: 10 FL (ref 9.2–11.8)
PSA SERPL-MCNC: 0.4 NG/ML (ref 0–4)
RBC # BLD AUTO: 5.54 M/UL (ref 4.7–5.5)
WBC # BLD AUTO: 7.5 K/UL (ref 4.6–13.2)

## 2019-07-19 PROCEDURE — 85027 COMPLETE CBC AUTOMATED: CPT

## 2019-07-19 PROCEDURE — 36415 COLL VENOUS BLD VENIPUNCTURE: CPT

## 2019-07-19 PROCEDURE — 84153 ASSAY OF PSA TOTAL: CPT

## 2019-07-19 NOTE — PROGRESS NOTES
Geovani Saltness 37 y.o. male     Mr. William Umaña seen today for hypogonadism follow-up currently on testosterone replacement therapy by IM injection of testosterone-self injection at home reporting favorable response with improved strength stamina erectile function with ADL and overall sense of well-being  testosterone replacement/therapy held in abeyance because of rising hematocrit in April 2018  Patient has had one phlebotomy session-wishes to proceed with TRT     testosterone replacement therapy by intramuscular injection of testosterone- reporting favorable response with increased strength, stamina, libido, and erectile function     Vasectomy at urology [facility] November 2017  Patient reports favorable response to TRT with increased stamina, strength, and libido      PSA 0.4 in January 2014   Testosterone level 767 ng/dl on 21 May 2014 (7 days after intramuscular injection of 100 mg)   Hematocrit 47.8      PSA 0.4 in January 2014  Hematocrit 47.9 in May 24      PSA 0.4 on 22 May 2015  Hematocrit 51.6  \"     Testosterone 281 in December 2015  Testosterone 767 in May 2014  PSA 0.4 in 2013  Testosterone 353 2013     Testosterone 143 on 28 March 2018     PSA 0.3 on 27 March 2018  HCT 53.1                                                 TRT suspended April 2018                                         PSA 0.4 in January 2019:                         TRT resumed in January 2019  Hematocrit 48. 8      Review of Systems:   CNS: No seizures syncope or headaches no visual changes  Respiratory: No wheezing no shortness of breath  Cardiovascular:No palpitations or chest pain  Intestinal:No dyspepsia or change in bowel habits  Urinary: No irritative voiding symptoms  Skeletal: No bone or joint pain  Endocrine:Hypogonadism on TRT by intramuscular injections  Other:        Allergies: No Known Allergies   Medications:    Current Outpatient Medications   Medication Sig Dispense Refill    DEPO-TESTOSTERONE 200 mg/mL injection INJECT 1 ML EVERY 2 WEEKS. 10 mL 0    MONOJECT SYRINGE 3 mL 22 gauge x 1\" syrg INJECT 1 ML EVERY 3 WEEKS  2    testosterone cypionate (DEPOTESTOTERONE CYPIONATE) 200 mg/mL injection by IntraMUSCular route once.  cephALEXin (KEFLEX) 500 mg capsule Take 1 Cap by mouth three (3) times daily. 9 Cap 0    oxyCODONE-acetaminophen (PERCOCET) 5-325 mg per tablet Take 1 Tab by mouth every four (4) hours as needed for Pain. Max Daily Amount: 6 Tabs. 6 Tab 0    aspirin delayed-release 81 mg tablet Take  by mouth daily.  modafinil (PROVIGIL) 200 mg tablet Take 200 mg by mouth daily.  TESTOSTERONE  mg by IntraMUSCular route every fourteen (14) days. History reviewed. No pertinent past medical history. History reviewed. No pertinent surgical history. Social History     Socioeconomic History    Marital status:      Spouse name: Not on file    Number of children: Not on file    Years of education: Not on file    Highest education level: Not on file   Occupational History    Not on file   Social Needs    Financial resource strain: Not on file    Food insecurity:     Worry: Not on file     Inability: Not on file    Transportation needs:     Medical: Not on file     Non-medical: Not on file   Tobacco Use    Smoking status: Never Smoker    Smokeless tobacco: Never Used   Substance and Sexual Activity    Alcohol use:  Yes    Drug use: No    Sexual activity: Not on file   Lifestyle    Physical activity:     Days per week: Not on file     Minutes per session: Not on file    Stress: Not on file   Relationships    Social connections:     Talks on phone: Not on file     Gets together: Not on file     Attends [employer] service: Not on file     Active member of club or organization: Not on file     Attends meetings of clubs or organizations: Not on file     Relationship status: Not on file    Intimate partner violence:     Fear of current or ex partner: Not on file     Emotionally abused: Not on file     Physically abused: Not on file     Forced sexual activity: Not on file   Other Topics Concern    Not on file   Social History Narrative    Not on file      History reviewed. No pertinent family history. Physical Examination: Well-nourished mature male in no apparent distress    Prostate by LAO is large rounded smooth benign consistency and nontender-no induration no nodularity   No rectal masses induration or tenderness     urinalysis: Negative dipstick/nitrite negative/heme-negative    Impression: Hypogonadism responding favorably to testosterone replacement therapy by IM injection of testosterone        Plan: Testosterone 20 mg IM every 3 weeks    Rx testosterone in oil 200 mg/cc dispense 10 cc signify 1 cc IM every 3 weeks 3 cc syringes #10 21-gauge needles #10 refill x3 prescription faxed to compounding pharmacy in [facility]    [phone]    RTC 6 months CBC PSA      More than 1/2 of this 15 minute visit was spent in counselling and coordination of care, as described above. Mele Claudio MD  -electronically signed-    PLEASE NOTE:  This document has been produced using voice recognition software. Unrecognized errors in transcription may be present.

## 2019-10-09 ENCOUNTER — TELEPHONE (OUTPATIENT)
Dept: UROLOGY | Age: 34
End: 2019-10-09

## 2019-10-09 NOTE — TELEPHONE ENCOUNTER
Patient requesting new prescription because [facility] closed. Patient requesting new rx so he can take it to his choice of pharmacy. Requesting the rx to be mailed to him.

## 2019-10-18 DIAGNOSIS — E29.1 HYPOGONADISM IN MALE: Primary | ICD-10-CM

## 2019-10-18 RX ORDER — TESTOSTERONE CYPIONATE 200 MG/ML
INJECTION INTRAMUSCULAR
Qty: 10 ML | Refills: 1 | Status: SHIPPED | OUTPATIENT
Start: 2019-10-18

## 2019-10-18 RX ORDER — SYRINGE W-NEEDLE,DISPOSAB,3 ML 25GX5/8"
1 SYRINGE, EMPTY DISPOSABLE MISCELLANEOUS
Qty: 10 PEN NEEDLE | Refills: 1 | Status: SHIPPED | OUTPATIENT
Start: 2019-10-18

## 2022-03-20 PROBLEM — E66.01 SEVERE OBESITY (HCC): Status: ACTIVE | Noted: 2019-07-18

## 2022-11-22 ENCOUNTER — OFFICE VISIT (OUTPATIENT)
Dept: ORTHOPEDIC SURGERY | Age: 37
End: 2022-11-22
Payer: COMMERCIAL

## 2022-11-22 VITALS — WEIGHT: 251 LBS | BODY MASS INDEX: 32.21 KG/M2 | TEMPERATURE: 97.1 F | HEIGHT: 74 IN

## 2022-11-22 DIAGNOSIS — M23.006 CYST OF MENISCUS OF RIGHT KNEE: Primary | ICD-10-CM

## 2022-11-22 DIAGNOSIS — M25.561 RIGHT KNEE PAIN, UNSPECIFIED CHRONICITY: ICD-10-CM

## 2022-11-22 PROCEDURE — 99203 OFFICE O/P NEW LOW 30 MIN: CPT | Performed by: ORTHOPAEDIC SURGERY

## 2022-11-22 PROCEDURE — 73560 X-RAY EXAM OF KNEE 1 OR 2: CPT | Performed by: ORTHOPAEDIC SURGERY

## 2022-11-22 NOTE — PROGRESS NOTES
Sam Bucio  1985   Chief Complaint   Patient presents with    Knee Pain     Rt         HISTORY OF PRESENT ILLNESS  Sam Bucio is a 42 y.o. male who presents today for evaluation of right knee. Pt referred by Dr. Umair Willis. He rates his pain 0/10 today. He notes a knot/raised area on the medial aspect of his right knee since August. He first noticed this the day after doing some weighted squats. No other specific injury or trauma. Is having minimal associated pain. Has tried following treatments: Injections:NO; Brace:NO; Therapy:NO; Cane/Crutch:NO       No Known Allergies     History reviewed. No pertinent past medical history. Social History     Socioeconomic History    Marital status:      Spouse name: Not on file    Number of children: Not on file    Years of education: Not on file    Highest education level: Not on file   Occupational History    Not on file   Tobacco Use    Smoking status: Never    Smokeless tobacco: Never   Substance and Sexual Activity    Alcohol use: Yes    Drug use: No    Sexual activity: Not on file   Other Topics Concern    Not on file   Social History Narrative    Not on file     Social Determinants of Health     Financial Resource Strain: Not on file   Food Insecurity: Not on file   Transportation Needs: Not on file   Physical Activity: Not on file   Stress: Not on file   Social Connections: Not on file   Intimate Partner Violence: Not on file   Housing Stability: Not on file      History reviewed. No pertinent surgical history. History reviewed. No pertinent family history. Current Outpatient Medications   Medication Sig    testosterone cypionate (DEPOTESTOTERONE CYPIONATE) 200 mg/mL injection 200 mg/ml IM injection every 3 weeks. syringe with needle (SYRINGE 3CC/28GL2-2/2\") 3 mL 21 gauge x 1 1/2\" syrg 1 mL by IntraMUSCular route every twenty-one (21) days.     MONOJECT SYRINGE 3 mL 22 gauge x 1\" syrg INJECT 1 ML EVERY 3 WEEKS    testosterone cypionate (DEPOTESTOTERONE CYPIONATE) 200 mg/mL injection by IntraMUSCular route once. cephALEXin (KEFLEX) 500 mg capsule Take 1 Cap by mouth three (3) times daily. oxyCODONE-acetaminophen (PERCOCET) 5-325 mg per tablet Take 1 Tab by mouth every four (4) hours as needed for Pain. Max Daily Amount: 6 Tabs. DEPO-TESTOSTERONE 200 mg/mL injection INJECT 1 ML EVERY 2 WEEKS. aspirin delayed-release 81 mg tablet Take  by mouth daily. modafiniL (PROVIGIL) 200 mg tablet Take 200 mg by mouth daily. TESTOSTERONE  mg by IntraMUSCular route every fourteen (14) days. No current facility-administered medications for this visit. REVIEW OF SYSTEM   Patient denies: Weight loss, Fever/Chills, HA, Visual changes, Fatigue, Chest pain, SOB, Abdominal pain, N/V/D/C, Blood in stool or urine, Edema. Pertinent positive as above in HPI. All others were negative    PHYSICAL EXAM:   Visit Vitals  Temp 97.1 °F (36.2 °C) (Temporal)   Ht 6' 2\" (1.88 m)   Wt 251 lb (113.9 kg)   BMI 32.23 kg/m²     The patient is a well-developed, well-nourished male   in no acute distress. The patient is alert and oriented times three. The patient is alert and oriented times three. Mood and affect are normal.  LYMPHATIC: lymph nodes are not enlarged and are within normal limits  SKIN: normal in color and non tender to palpation. There are no bruises or abrasions noted. NEUROLOGICAL: Motor sensory exam is within normal limits. Reflexes are equal bilaterally.  There is normal sensation to pinprick and light touch  MUSCULOSKELETAL:  Examination Right knee   Skin Intact   Range of motion 0-130   Effusion +   Medial joint line tenderness +   Lateral joint line tenderness -   Tenderness Pes Bursa -   Tenderness insertion MCL -   Tenderness insertion LCL -   Dunias +   Patella crepitus -   Patella grind -   Lachman -   Pivot shift -   Anterior drawer -   Posterior drawer -   Varus stress -   Valgus stress -   Neurovascular Intact   Calf Swelling and Tenderness to Palpation -   Nia's Test -   Hamstring Cord Tightness -        IMAGING: XR of the right knee with 2 views obtained in the office dated 11/22/2022 was reviewed and read by Dr. Alvarez Vargas: No acute abnormalities       IMPRESSION:      ICD-10-CM ICD-9-CM    1. Cyst of meniscus of right knee  M23.006 717.5       2. Right knee pain, unspecified chronicity  M25.561 719.46 AMB POC XRAY, KNEE; 1/2 VIEWS           PLAN:  1. Pt presents today with medial-sided right knee mass c/w possible meniscal cyst. I would like to order a right knee MRI with and without IV contrast for further evaluation. Risk factors include: BMI>30  2. No ultrasound exam indicated today  3. No cortisone injection indicated today   4. No Physical/Occupational Therapy indicated today  5. Yes diagnostic test indicated today: MRI R KNEE W WO IV CONTRAST  6. No durable medical equipment indicated today  7. No referral indicated today   8. No medications indicated today:   9. No Narcotic indicated today     RTC following MRI    Office note will be sent to referring provider. Scribed by Shiva Myers) as dictated by Giancarlo Duke MD    I, Dr. Giancarlo Duke, confirm that all documentation is accurate.     Giancarlo Duke M.D.   [facility]

## 2022-12-02 ENCOUNTER — HOSPITAL ENCOUNTER (OUTPATIENT)
Age: 37
End: 2022-12-02
Attending: ORTHOPAEDIC SURGERY
Payer: COMMERCIAL

## 2022-12-02 DIAGNOSIS — M23.006 CYST OF MENISCUS OF RIGHT KNEE: ICD-10-CM

## 2022-12-02 LAB — CREAT UR-MCNC: 0.9 MG/DL (ref 0.6–1.3)

## 2022-12-02 PROCEDURE — 73721 MRI JNT OF LWR EXTRE W/O DYE: CPT

## 2022-12-02 PROCEDURE — 82565 ASSAY OF CREATININE: CPT

## 2022-12-13 ENCOUNTER — OFFICE VISIT (OUTPATIENT)
Dept: ORTHOPEDIC SURGERY | Age: 37
End: 2022-12-13
Payer: COMMERCIAL

## 2022-12-13 VITALS — BODY MASS INDEX: 31.54 KG/M2 | HEIGHT: 74 IN | WEIGHT: 245.8 LBS

## 2022-12-13 DIAGNOSIS — M71.21 SYNOVIAL CYST OF RIGHT KNEE: Primary | ICD-10-CM

## 2022-12-13 PROCEDURE — 99213 OFFICE O/P EST LOW 20 MIN: CPT | Performed by: ORTHOPAEDIC SURGERY

## 2022-12-13 RX ORDER — FLUOXETINE 10 MG/1
10 CAPSULE ORAL DAILY
COMMUNITY
Start: 2022-12-07

## 2022-12-13 RX ORDER — LISINOPRIL 5 MG/1
5 TABLET ORAL DAILY
COMMUNITY
Start: 2022-11-01

## 2022-12-13 RX ORDER — ACETAMINOPHEN AND CODEINE PHOSPHATE 300; 30 MG/1; MG/1
TABLET ORAL
COMMUNITY
Start: 2022-12-08

## 2022-12-13 NOTE — PROGRESS NOTES
Brian Lang  1985   Chief Complaint   Patient presents with    Results     MRI on rt knee         HISTORY OF PRESENT ILLNESS  Brian Lang is a 42 y.o. male who presents today for reevaluation of right knee and MRI review. Patient rates pain as 0/10 today. He notes a knot/raised area on the medial aspect of his right knee since August. He first noticed this the day after doing some weighted squats. No other specific injury or trauma. Is having minimal associated pain. He notes the knot has decreased in size since last OV. Patient denies any fever, chills, chest pain, shortness of breath or calf pain. The remainder of the review of systems is negative. There are no new illness or injuries to report since last seen in the office. There are no changes to medications, allergies, family or social history. PHYSICAL EXAM:   Visit Vitals  Ht 6' 2\" (1.88 m)   Wt 245 lb 12.8 oz (111.5 kg)   BMI 31.56 kg/m²     The patient is a well-developed, well-nourished male   in no acute distress. The patient is alert and oriented times three. The patient is alert and oriented times three. Mood and affect are normal.  LYMPHATIC: lymph nodes are not enlarged and are within normal limits  SKIN: normal in color and non tender to palpation. There are no bruises or abrasions noted. NEUROLOGICAL: Motor sensory exam is within normal limits. Reflexes are equal bilaterally.  There is normal sensation to pinprick and light touch  MUSCULOSKELETAL:  Examination Right knee   Skin Intact   Range of motion 0-130   Effusion -   Medial joint line tenderness +   Lateral joint line tenderness -   Tenderness Pes Bursa -   Tenderness insertion MCL -   Tenderness insertion LCL -   Dunias +   Patella crepitus -   Patella grind -   Lachman -   Pivot shift -   Anterior drawer -   Posterior drawer -   Varus stress -   Valgus stress -   Neurovascular Intact   Calf Swelling and Tenderness to Palpation -   Nia's Test -   Hamstring Cord Tightness -         IMAGING: MRI of right knee without and with IV contrast dated 12/02/2022 was reviewed and read by Dr. Mary Lou Ross:   IMPRESSION:  Complex multiseptated collection along the medial and anteromedial aspect of the  knee (9 x 2 x 5 cm), communicating with the deep infrapatellar bursa and a small  fluid collection along the posterior inferior aspect of Hoffa's fat pad. This is  favored to reflect a synovial or ganglion cyst although recommend further  evaluation with MRI without and with IV contrast to exclude the unlikely  possibility of synovial sarcoma.  -No meniscus tear detected although given that the collection extends in close  proximity to the anterior root of the medial meniscus, an occult meniscus tear  is possible. Mild lateral patellar tilt. Mild chondromalacia in the medial and patellofemoral  compartments. XR of the right knee with 2 views obtained in the office dated 11/22/2022 was reviewed and read by Dr. Mary Lou Ross: No acute abnormalities     IMPRESSION:      ICD-10-CM ICD-9-CM    1. Synovial cyst of right knee  M71.21 727.51            PLAN:   1. Pt presents today with medial-sided right knee mass. MRI reveals findings c/w synovial cyst. The MRI could not completely exclude meniscal tear. We discussed knee arthroscopy in the office today however patient is having minimal associated pain. His symptoms are not c/w synovial sarcoma. He was instructed to monitor for symptoms including increasing pain, night pain, or increase in size of the mass. Can follow up as needed. Risk factors include: BMI>30  2. No ultrasound exam indicated today  3. No cortisone injection indicated today   4. No Physical/Occupational Therapy indicated today  5. No diagnostic test indicated today:   6. No durable medical equipment indicated today  7. No referral indicated today   8. No medications indicated today:   9.  No Narcotic indicated today       RTC prn      Scribed by Clementine Mcfarlandtingham ([address]) as dictated by MD SUSI Borrego, Dr. Tia Ross, confirm that all documentation is accurate.     Tia Ross M.D.   [facility]

## 2024-04-23 ENCOUNTER — HOSPITAL ENCOUNTER (EMERGENCY)
Facility: HOSPITAL | Age: 39
Discharge: HOME | End: 2024-04-23
Attending: EMERGENCY MEDICINE
Payer: COMMERCIAL

## 2024-04-23 ENCOUNTER — APPOINTMENT (OUTPATIENT)
Facility: HOSPITAL | Age: 39
End: 2024-04-23
Payer: COMMERCIAL

## 2024-04-23 VITALS
SYSTOLIC BLOOD PRESSURE: 138 MMHG | HEIGHT: 74 IN | TEMPERATURE: 98.3 F | WEIGHT: 230 LBS | BODY MASS INDEX: 29.52 KG/M2 | HEART RATE: 61 BPM | OXYGEN SATURATION: 98 % | RESPIRATION RATE: 15 BRPM | DIASTOLIC BLOOD PRESSURE: 76 MMHG

## 2024-04-23 DIAGNOSIS — R10.13 ABDOMINAL PAIN, EPIGASTRIC: ICD-10-CM

## 2024-04-23 DIAGNOSIS — R11.2 CANNABINOID HYPEREMESIS SYNDROME: Primary | ICD-10-CM

## 2024-04-23 DIAGNOSIS — F12.90 CANNABINOID HYPEREMESIS SYNDROME: Primary | ICD-10-CM

## 2024-04-23 LAB
ALBUMIN SERPL-MCNC: 4.3 G/DL (ref 3.4–5)
ALBUMIN/GLOB SERPL: 1.4 (ref 0.8–1.7)
ALP SERPL-CCNC: 72 U/L (ref 45–117)
ALT SERPL-CCNC: 58 U/L (ref 16–61)
ANION GAP SERPL CALC-SCNC: 9 MMOL/L (ref 3–18)
AST SERPL-CCNC: 29 U/L (ref 10–38)
BASOPHILS # BLD: 0 K/UL (ref 0–0.1)
BASOPHILS NFR BLD: 0 % (ref 0–2)
BILIRUB SERPL-MCNC: 1.1 MG/DL (ref 0.2–1)
BUN SERPL-MCNC: 16 MG/DL (ref 7–18)
BUN/CREAT SERPL: 14 (ref 12–20)
CALCIUM SERPL-MCNC: 9.5 MG/DL (ref 8.5–10.1)
CHLORIDE SERPL-SCNC: 105 MMOL/L (ref 100–111)
CO2 SERPL-SCNC: 24 MMOL/L (ref 21–32)
CREAT SERPL-MCNC: 1.17 MG/DL (ref 0.6–1.3)
DIFFERENTIAL METHOD BLD: ABNORMAL
EOSINOPHIL # BLD: 0 K/UL (ref 0–0.4)
EOSINOPHIL NFR BLD: 0 % (ref 0–5)
ERYTHROCYTE [DISTWIDTH] IN BLOOD BY AUTOMATED COUNT: 11.3 % (ref 11.6–14.5)
GLOBULIN SER CALC-MCNC: 3 G/DL (ref 2–4)
GLUCOSE SERPL-MCNC: 148 MG/DL (ref 74–99)
HCT VFR BLD AUTO: 45.9 % (ref 36–48)
HGB BLD-MCNC: 16.4 G/DL (ref 13–16)
IMM GRANULOCYTES # BLD AUTO: 0 K/UL (ref 0–0.04)
IMM GRANULOCYTES NFR BLD AUTO: 0 % (ref 0–0.5)
LIPASE SERPL-CCNC: 50 U/L (ref 13–75)
LYMPHOCYTES # BLD: 1.5 K/UL (ref 0.9–3.6)
LYMPHOCYTES NFR BLD: 16 % (ref 21–52)
MCH RBC QN AUTO: 31 PG (ref 24–34)
MCHC RBC AUTO-ENTMCNC: 35.7 G/DL (ref 31–37)
MCV RBC AUTO: 86.8 FL (ref 78–100)
MONOCYTES # BLD: 0.5 K/UL (ref 0.05–1.2)
MONOCYTES NFR BLD: 5 % (ref 3–10)
NEUTS SEG # BLD: 7.7 K/UL (ref 1.8–8)
NEUTS SEG NFR BLD: 79 % (ref 40–73)
NRBC # BLD: 0 K/UL (ref 0–0.01)
NRBC BLD-RTO: 0 PER 100 WBC
PLATELET # BLD AUTO: 215 K/UL (ref 135–420)
PMV BLD AUTO: 10.1 FL (ref 9.2–11.8)
POTASSIUM SERPL-SCNC: 3.4 MMOL/L (ref 3.5–5.5)
PROT SERPL-MCNC: 7.3 G/DL (ref 6.4–8.2)
RBC # BLD AUTO: 5.29 M/UL (ref 4.35–5.65)
SODIUM SERPL-SCNC: 138 MMOL/L (ref 136–145)
TROPONIN I SERPL HS-MCNC: 5 NG/L (ref 0–78)
WBC # BLD AUTO: 9.8 K/UL (ref 4.6–13.2)

## 2024-04-23 PROCEDURE — 96375 TX/PRO/DX INJ NEW DRUG ADDON: CPT

## 2024-04-23 PROCEDURE — A4216 STERILE WATER/SALINE, 10 ML: HCPCS | Performed by: EMERGENCY MEDICINE

## 2024-04-23 PROCEDURE — 6370000000 HC RX 637 (ALT 250 FOR IP): Performed by: EMERGENCY MEDICINE

## 2024-04-23 PROCEDURE — 99284 EMERGENCY DEPT VISIT MOD MDM: CPT

## 2024-04-23 PROCEDURE — 93005 ELECTROCARDIOGRAM TRACING: CPT | Performed by: EMERGENCY MEDICINE

## 2024-04-23 PROCEDURE — 80053 COMPREHEN METABOLIC PANEL: CPT

## 2024-04-23 PROCEDURE — 85025 COMPLETE CBC W/AUTO DIFF WBC: CPT

## 2024-04-23 PROCEDURE — 84484 ASSAY OF TROPONIN QUANT: CPT

## 2024-04-23 PROCEDURE — 2580000003 HC RX 258: Performed by: EMERGENCY MEDICINE

## 2024-04-23 PROCEDURE — 76705 ECHO EXAM OF ABDOMEN: CPT

## 2024-04-23 PROCEDURE — 6360000002 HC RX W HCPCS: Performed by: EMERGENCY MEDICINE

## 2024-04-23 PROCEDURE — C9113 INJ PANTOPRAZOLE SODIUM, VIA: HCPCS | Performed by: EMERGENCY MEDICINE

## 2024-04-23 PROCEDURE — 96374 THER/PROPH/DIAG INJ IV PUSH: CPT

## 2024-04-23 PROCEDURE — 83690 ASSAY OF LIPASE: CPT

## 2024-04-23 RX ORDER — METOCLOPRAMIDE 10 MG/1
10 TABLET ORAL 3 TIMES DAILY PRN
Qty: 21 TABLET | Refills: 0 | Status: SHIPPED | OUTPATIENT
Start: 2024-04-23

## 2024-04-23 RX ORDER — OMEPRAZOLE 20 MG/1
20 CAPSULE, DELAYED RELEASE ORAL DAILY
Qty: 30 CAPSULE | Refills: 0 | Status: SHIPPED | OUTPATIENT
Start: 2024-04-23

## 2024-04-23 RX ORDER — ONDANSETRON 4 MG/1
4 TABLET, ORALLY DISINTEGRATING ORAL EVERY 8 HOURS PRN
Qty: 21 TABLET | Refills: 0 | Status: SHIPPED | OUTPATIENT
Start: 2024-04-23

## 2024-04-23 RX ORDER — METOCLOPRAMIDE HYDROCHLORIDE 5 MG/ML
10 INJECTION INTRAMUSCULAR; INTRAVENOUS
Status: DISCONTINUED | OUTPATIENT
Start: 2024-04-23 | End: 2024-04-23

## 2024-04-23 RX ORDER — MAG HYDROX/ALUMINUM HYD/SIMETH 400-400-40
15 SUSPENSION, ORAL (FINAL DOSE FORM) ORAL EVERY 6 HOURS PRN
Qty: 355 ML | Refills: 0 | Status: SHIPPED | OUTPATIENT
Start: 2024-04-23

## 2024-04-23 RX ORDER — DROPERIDOL 2.5 MG/ML
1.25 INJECTION, SOLUTION INTRAMUSCULAR; INTRAVENOUS ONCE
Status: COMPLETED | OUTPATIENT
Start: 2024-04-23 | End: 2024-04-23

## 2024-04-23 RX ORDER — HYDROMORPHONE HYDROCHLORIDE 1 MG/ML
1 INJECTION, SOLUTION INTRAMUSCULAR; INTRAVENOUS; SUBCUTANEOUS
Status: DISCONTINUED | OUTPATIENT
Start: 2024-04-23 | End: 2024-04-23

## 2024-04-23 RX ORDER — SUCRALFATE ORAL 1 G/10ML
1 SUSPENSION ORAL 4 TIMES DAILY
Qty: 280 ML | Refills: 0 | Status: SHIPPED | OUTPATIENT
Start: 2024-04-23 | End: 2024-04-30

## 2024-04-23 RX ORDER — DICYCLOMINE HYDROCHLORIDE 10 MG/1
20 CAPSULE ORAL ONCE
Status: COMPLETED | OUTPATIENT
Start: 2024-04-23 | End: 2024-04-23

## 2024-04-23 RX ORDER — ACETAMINOPHEN 500 MG
1000 TABLET ORAL
Status: COMPLETED | OUTPATIENT
Start: 2024-04-23 | End: 2024-04-23

## 2024-04-23 RX ORDER — NITROGLYCERIN 0.4 MG/1
0.4 TABLET SUBLINGUAL ONCE
Status: DISCONTINUED | OUTPATIENT
Start: 2024-04-23 | End: 2024-04-23

## 2024-04-23 RX ADMIN — DICYCLOMINE HYDROCHLORIDE 20 MG: 10 CAPSULE ORAL at 11:55

## 2024-04-23 RX ADMIN — PANTOPRAZOLE SODIUM 40 MG: 40 INJECTION, POWDER, FOR SOLUTION INTRAVENOUS at 08:35

## 2024-04-23 RX ADMIN — ACETAMINOPHEN 1000 MG: 500 TABLET ORAL at 11:55

## 2024-04-23 RX ADMIN — DROPERIDOL 1.25 MG: 2.5 INJECTION, SOLUTION INTRAMUSCULAR; INTRAVENOUS at 08:43

## 2024-04-23 RX ADMIN — HYDROMORPHONE HYDROCHLORIDE 0.5 MG: 1 INJECTION, SOLUTION INTRAMUSCULAR; INTRAVENOUS; SUBCUTANEOUS at 08:33

## 2024-04-23 RX ADMIN — LIDOCAINE HYDROCHLORIDE 40 ML: 20 SOLUTION ORAL at 08:34

## 2024-04-23 ASSESSMENT — LIFESTYLE VARIABLES
HOW OFTEN DO YOU HAVE A DRINK CONTAINING ALCOHOL: NEVER
HOW MANY STANDARD DRINKS CONTAINING ALCOHOL DO YOU HAVE ON A TYPICAL DAY: PATIENT DOES NOT DRINK

## 2024-04-23 ASSESSMENT — PAIN SCALES - GENERAL
PAINLEVEL_OUTOF10: 10
PAINLEVEL_OUTOF10: 7

## 2024-04-23 ASSESSMENT — PAIN DESCRIPTION - LOCATION
LOCATION: ABDOMEN
LOCATION: ABDOMEN

## 2024-04-23 ASSESSMENT — PAIN - FUNCTIONAL ASSESSMENT: PAIN_FUNCTIONAL_ASSESSMENT: 0-10

## 2024-04-23 NOTE — ED NOTES
Report was taken at bedside from Eva RN for assumption of care. Pt is currently in bed asleep, on room air and in no voiced or noticeable acute distress. Pt has a visitor at bedside.

## 2024-04-23 NOTE — ED PROVIDER NOTES
software. Quite often unanticipated grammatical, syntax, homophones, and other interpretive errors are inadvertently transcribed by the computer software. Please disregard these errors. Please excuse any errors that have escaped final proofreading.     Horacio Salas D.O.  Emergency Physician  [facility]             Horacio Salas DO  04/23/24 [id number]

## 2024-04-24 LAB
EKG ATRIAL RATE: 58 BPM
EKG DIAGNOSIS: NORMAL
EKG P AXIS: -5 DEGREES
EKG P-R INTERVAL: 142 MS
EKG Q-T INTERVAL: 432 MS
EKG QRS DURATION: 108 MS
EKG QTC CALCULATION (BAZETT): 424 MS
EKG R AXIS: 92 DEGREES
EKG T AXIS: 20 DEGREES
EKG VENTRICULAR RATE: 58 BPM

## 2024-04-24 PROCEDURE — 93010 ELECTROCARDIOGRAM REPORT: CPT | Performed by: INTERNAL MEDICINE
